# Patient Record
Sex: FEMALE | Race: AMERICAN INDIAN OR ALASKA NATIVE
[De-identification: names, ages, dates, MRNs, and addresses within clinical notes are randomized per-mention and may not be internally consistent; named-entity substitution may affect disease eponyms.]

---

## 2020-11-19 ENCOUNTER — HOSPITAL ENCOUNTER (EMERGENCY)
Dept: HOSPITAL 5 - ED | Age: 55
Discharge: OTHER INSTITUTION | End: 2020-11-19
Payer: SELF-PAY

## 2020-11-19 VITALS — DIASTOLIC BLOOD PRESSURE: 57 MMHG | SYSTOLIC BLOOD PRESSURE: 101 MMHG

## 2020-11-19 DIAGNOSIS — E87.2: ICD-10-CM

## 2020-11-19 DIAGNOSIS — R41.82: ICD-10-CM

## 2020-11-19 DIAGNOSIS — Z79.899: ICD-10-CM

## 2020-11-19 DIAGNOSIS — Z79.82: ICD-10-CM

## 2020-11-19 DIAGNOSIS — I61.9: ICD-10-CM

## 2020-11-19 DIAGNOSIS — I10: Primary | ICD-10-CM

## 2020-11-19 LAB
ALBUMIN SERPL-MCNC: 4.4 G/DL (ref 3.9–5)
ALT SERPL-CCNC: 12 UNITS/L (ref 7–56)
APTT BLD: 27.7 SEC. (ref 24.2–36.6)
BASOPHILS # (AUTO): 0.1 K/MM3 (ref 0–0.1)
BASOPHILS NFR BLD AUTO: 0.4 % (ref 0–1.8)
BUN SERPL-MCNC: 12 MG/DL (ref 7–17)
BUN/CREAT SERPL: 24 %
CALCIUM SERPL-MCNC: 9.8 MG/DL (ref 8.4–10.2)
EOSINOPHIL # BLD AUTO: 0.1 K/MM3 (ref 0–0.4)
EOSINOPHIL NFR BLD AUTO: 0.3 % (ref 0–4.3)
HCO3 BLDA-SCNC: 21 MMOL/L (ref 20–26)
HCT VFR BLD CALC: 34 % (ref 30.3–42.9)
HEMOLYSIS INDEX: 62
HGB BLD-MCNC: 11.6 GM/DL (ref 10.1–14.3)
INR PPP: 1.04 (ref 0.87–1.13)
LYMPHOCYTES # BLD AUTO: 4.2 K/MM3 (ref 1.2–5.4)
LYMPHOCYTES NFR BLD AUTO: 14.1 % (ref 13.4–35)
MCHC RBC AUTO-ENTMCNC: 34 % (ref 30–34)
MCV RBC AUTO: 91 FL (ref 79–97)
MONOCYTES # (AUTO): 1.3 K/MM3 (ref 0–0.8)
MONOCYTES % (AUTO): 4.3 % (ref 0–7.3)
PCO2 BLDA: 36.7 MM HG
PH BLDA: 7.38 PH UNITS (ref 7.35–7.45)
PLATELET # BLD: 526 K/MM3 (ref 140–440)
PO2 BLDA: 172.6 MM HG (ref 80–90)
RBC # BLD AUTO: 3.74 M/MM3 (ref 3.65–5.03)

## 2020-11-19 PROCEDURE — 80320 DRUG SCREEN QUANTALCOHOLS: CPT

## 2020-11-19 PROCEDURE — 82553 CREATINE MB FRACTION: CPT

## 2020-11-19 PROCEDURE — 96368 THER/DIAG CONCURRENT INF: CPT

## 2020-11-19 PROCEDURE — 85670 THROMBIN TIME PLASMA: CPT

## 2020-11-19 PROCEDURE — 36415 COLL VENOUS BLD VENIPUNCTURE: CPT

## 2020-11-19 PROCEDURE — 93005 ELECTROCARDIOGRAM TRACING: CPT

## 2020-11-19 PROCEDURE — 85025 COMPLETE CBC W/AUTO DIFF WBC: CPT

## 2020-11-19 PROCEDURE — 82803 BLOOD GASES ANY COMBINATION: CPT

## 2020-11-19 PROCEDURE — 84484 ASSAY OF TROPONIN QUANT: CPT

## 2020-11-19 PROCEDURE — 99292 CRITICAL CARE ADDL 30 MIN: CPT

## 2020-11-19 PROCEDURE — 82140 ASSAY OF AMMONIA: CPT

## 2020-11-19 PROCEDURE — 82550 ASSAY OF CK (CPK): CPT

## 2020-11-19 PROCEDURE — 96366 THER/PROPH/DIAG IV INF ADDON: CPT

## 2020-11-19 PROCEDURE — 96375 TX/PRO/DX INJ NEW DRUG ADDON: CPT

## 2020-11-19 PROCEDURE — 70450 CT HEAD/BRAIN W/O DYE: CPT

## 2020-11-19 PROCEDURE — 85610 PROTHROMBIN TIME: CPT

## 2020-11-19 PROCEDURE — G0480 DRUG TEST DEF 1-7 CLASSES: HCPCS

## 2020-11-19 PROCEDURE — 94002 VENT MGMT INPAT INIT DAY: CPT

## 2020-11-19 PROCEDURE — 96365 THER/PROPH/DIAG IV INF INIT: CPT

## 2020-11-19 PROCEDURE — 31500 INSERT EMERGENCY AIRWAY: CPT

## 2020-11-19 PROCEDURE — 99291 CRITICAL CARE FIRST HOUR: CPT

## 2020-11-19 PROCEDURE — 80053 COMPREHEN METABOLIC PANEL: CPT

## 2020-11-19 PROCEDURE — 85730 THROMBOPLASTIN TIME PARTIAL: CPT

## 2020-11-19 PROCEDURE — 71045 X-RAY EXAM CHEST 1 VIEW: CPT

## 2020-11-19 NOTE — CONSULTATION
History of Present Illness


History of present illness: 





[facility]


[facility]








Date of Service:   11/19/2020 01:47:14





Impression:


      SAH, SDH, ICH





Comments/Sign-Out:


Patient is a 62 year old woman who presented with EMS as she was found 

unresponsive on the ground by her family at home, last time they saw her walking

and talking was last night at 930 pm, she is now not responding. Per report she 

had a stroke 2 weeks ago but dont know deficit from that - Cardene gtt and BP to

be < 140/80 - STAT NSG consult - No AP or AC





Metrics:


Last Known Well: 11/18/2020 21:30:00


[facility] Notification Time: 11/19/2020 01:47:14


Arrival Time: 11/19/2020 01:45:00


Stamp Time: 11/19/2020 01:47:14


Time First Login Attempt: 11/19/2020 01:52:00


Video Start Time: 11/19/2020 01:52:00





Symptoms: AMS


NIHSS Start Assessment Time: 11/19/2020 02:00:00


Patient is not a candidate for Alteplase/Activase.


Patient was not deemed candidate for Alteplase/Activase thrombolytics because of

Current or Previous ICH.


Video End Time: 11/19/2020 02:06:28





CT head was reviewed and results were: SDH, HECTOR and SAH





Clinical Presentation is not Suggestive of Large Vessel Occlusive Disease





ED Physician notified of diagnostic impression and management plan on 11/19/2020

02:06:30





Our recommendations are outlined below.





Recommendations:


      Activate Stroke Protocol Admission/Order Set


      Stroke/Telemetry Floor


      Neuro Checks


      Bedside Swallow Eval


      DVT Prophylaxis


      IV Fluids, Normal Saline


      Head of Bed 30 Degrees


      Euglycemia and Avoid Hyperthermia (PRN Acetaminophen)





Routine Consultation with Inhouse Neurology for Follow up Care





Sign Out:


      Discussed with Emergency Department Provider











------------------------------------------------------------------------------





History of Present Illness:


Patient is a 55 year old Female.





Patient was brought by EMS for symptoms of AMS





Patient is a 55 year old woman who presented with EMS as she was found 

unresponsive on the ground by her family at home, last time they saw her walking

and talking was last night at 930 pm, she is now not responding. Per report she 

had a stroke 2 weeks ago but dont know deficit from that


 


 


 





Examination:


BP(230/110), Pulse(80s), Blood Glucose(na)


NIHSS cannot be completed due to patient status.





Patient is not responding to painful or verbal stimuli and not awake





Pre-Morbid Modified Ranking Scale:


5 Points = Severe disability; bedridden, incontinent and requiring constant nurs

ing care and attention





Patient/Family was informed the Neurology Consult would happen via TeleHealth 

consult by way of interactive audio and video telecommunications and consented 

to receiving care in this manner.








Due to the immediate potential for life-threatening deterioration due to 

underlying acute neurologic illness, I spent 30 minutes providing critical care.

This time includes time for face to face visit via telemedicine, review of 

medical records, imaging studies and discussion of findings with providers, the 

patient and/or family.








Dr Aram Contreras








[facility]


[phone]





Case [id number]


 





Medications and Allergies


Active Meds: 


Active Medications





Nicardipine HCl 50 mg/ Sodium (Chloride)  250 mls @ 25 mls/hr IV TITR PALMOO; 

Protocol

## 2020-11-19 NOTE — XRAY REPORT
CHEST 1 VIEW 11/19/2020 1:52 AM



INDICATION / CLINICAL INFORMATION: ams.



COMPARISON: None available.



FINDINGS:



SUPPORT DEVICES: ET tube tip is about 3 cm above the yrn. NG tube is seen extending below the diap
hragm.



HEART / MEDIASTINUM: No significant abnormality. 



LUNGS / PLEURA: There are mild bibasilar opacities. No pneumothorax. 



ADDITIONAL FINDINGS: No significant additional findings.



IMPRESSION:

1. Mild bibasilar pulmonary opacities that could reflect atelectasis, aspiration, or developing infec
tious process.



Signer Name: Brian Zuluaga MD 

Signed: 11/19/2020 2:57 AM

Workstation Name: [id number]

## 2020-11-19 NOTE — CAT SCAN REPORT
CT head/brain wo con



INDICATION:

Stroke-Like symptoms.



TECHNIQUE:

Head CT without contrast. All CT scans at this location are performed using CT dose reduction for ALA
RA by means of automated exposure control. 



COMPARISON:

None available.



FINDINGS:

There is a large parenchymal hemorrhage in the right frontal lobe measuring 3.1 cm with adjacent vaso
genic edema. There is also a right cerebral convexity subdural hematoma measuring about 1.2 cm in luz
e to side dimension. There is a subacute appearing right MCA territory infarct. There is significant 
leftward midline shift about 1.1 cm at the level of the septum pellucidum. There is also early right-
sided downward transtentorial herniation. There is entrapment of the left lateral ventricle.



IMPRESSION:

1. Multifocal intracranial hemorrhage including a right frontal parenchymal hemorrhage and large righ
t cerebral convexity subdural hematoma.

2. Subacute appearing right MCA territory infarct.

3. Significant adverse mass effect with leftward midline shift, entrapment of the left lateral ventri
cecilia, right sided downward transtentorial herniation.



Findings called to Dr. Santana at 1:15 AM on 11/19/2020.



Signer Name: Brian Zuluaga MD 

Signed: 11/19/2020 2:19 AM

Workstation Name: [id number]

## 2020-11-19 NOTE — EMERGENCY DEPARTMENT REPORT
ED Altered Mental Status HPI





- General


Chief Complaint: Altered Mental Status


Stated Complaint: UNRESPONSIVE


PUI?: No


Time Seen by Provider: 11/19/20 01:46


Source: EMS


Mode of arrival: Stretcher


Limitations: Altered Mental Status, Physical Limitation





- History of Present Illness


Initial Comments: 





Patient is a 55-year-old female that presents emergency room with altered mental

status and decreased responsiveness.  Report received from EMS.  EMS states the 

patient's initial blood pressure is 238/120.  Patient was found unresponsive.  

Patient is responsive only to painful stimuli.  Last known well time 9:30 PM 

11/18/2020.  Patient had a stroke 2 weeks ago and was seen at [facility].








MD Complaint: altered mental status, decreased responsiveness


-: Sudden





- Related Data


                                Home Medications











 Medication  Instructions  Recorded  Confirmed  Last Taken


 


Aspirin 81 mg PO DAILY 11/19/20 11/19/20 Unknown


 


AtorvaSTATin 80 mg PO DAILY 11/19/20 11/19/20 Unknown


 


Lisinopril 10 mg PO DAILY 11/19/20 11/19/20 Unknown


 


Ticagrelor 60 mg PO BID 11/19/20 11/19/20 Unknown


 


glipiZIDE 5 mg PO BID 11/19/20 11/19/20 Unknown


 


metFORMIN 1,000 mg PO BID 11/19/20 11/19/20 Unknown











                                    Allergies











Allergy/AdvReac Type Severity Reaction Status Date / Time


 


No Known Allergies Allergy   Verified 11/19/20 02:38














ED Review of Systems


ROS: 


Stated complaint: UNRESPONSIVE


Other details as noted in HPI





Comment: Unobtainable due to pts medical conditions





ED Past Medical Hx





- Past Medical History


Previous Medical History?: Yes


Hx Hypertension: Yes


Hx CVA: Yes





- Surgical History


Past Surgical History?: No





- Family History


Family history: no significant





- Social History


Smoking Status: Unknown if ever smoked


Substance Use Type: None





- Medications


Home Medications: 


                                Home Medications











 Medication  Instructions  Recorded  Confirmed  Last Taken  Type


 


Aspirin 81 mg PO DAILY 11/19/20 11/19/20 Unknown History


 


AtorvaSTATin 80 mg PO DAILY 11/19/20 11/19/20 Unknown History


 


Lisinopril 10 mg PO DAILY 11/19/20 11/19/20 Unknown History


 


Ticagrelor 60 mg PO BID 11/19/20 11/19/20 Unknown History


 


glipiZIDE 5 mg PO BID 11/19/20 11/19/20 Unknown History


 


metFORMIN 1,000 mg PO BID 11/19/20 11/19/20 Unknown History














ED Physical Exam





- General


Limitations: Altered Mental Status, Physical Limitation


General appearance: obtunded





- Head


Head exam: Present: atraumatic, normocephalic





- Eye


Eye exam: Present: normal appearance, other (Pupils are unequal)





- ENT


ENT exam: Present: mucous membranes dry





- Neck


Neck exam: Present: normal inspection





- Respiratory


Respiratory exam: Present: normal lung sounds bilaterally.  Absent: respiratory 

distress





- Cardiovascular


Cardiovascular Exam: Present: regular rate, normal rhythm.  Absent: systolic 

murmur, diastolic murmur, rubs, gallop





- GI/Abdominal


GI/Abdominal exam: Present: soft, normal bowel sounds.  Absent: distended, 

tenderness, guarding





- Extremities Exam


Extremities exam: Present: normal inspection





- Back Exam


Back exam: Present: normal inspection





- Neurological Exam


Neurological exam: Present: altered





- Expanded Neurological Exam


  ** Expanded


Best Eye Response (Dot): (1) no response


Best Motor Response (Dot): (4) withdraws to pain


Best Verbal Response (Simon): (1) no verbal response


Dot Total: 6





- Psychiatric


Psychiatric exam: Present: normal affect, normal mood





- Skin


Skin exam: Present: warm, dry, intact, normal color.  Absent: rash





- Assessment


Assessment Interval: Baseline





- Level of Consciousness


1a. Level of Consciousness: coma/unresponsive





- LOC Questions


1b. LOC Questions: aphasic





- LOC Command


1c. LOC Commands: performs no tasks correctly





- Best Gaze


2. Best Gaze: normal





- Visual


3. Visual: no visual loss





- Facial Palsy


4. Facial Palsy: normal symmetrical movement





- Motor Arm


5a. Motor Arm Left: no movement


5b. Motor Arm Right: no movement





- Motor Leg


6a. Motor Leg Left: no movement


6b. Motor Leg Right: no movement





- Limb Ataxia


7. Limb Ataxia: absent





- Sensory


8. Sensory: normal





- Best Language


9. Best Language: coma/unresponsive





- Dysarthria


10. Dysarthria: mute/anarrthric





- Extinction and Inattention


11. Extinction/Inattention: no abnormality





- Scoring


Total Score: 28


Stroke Severity: Severe Stroke





ED Course


                                   Vital Signs











  11/19/20 11/19/20 11/19/20





  02:42 02:50 04:13


 


Temperature 93 F L  


 


Pulse Rate 103 H 106 H 111 H


 


Respiratory 16  16





Rate   


 


Blood Pressure 130/74 130/74 


 


Blood Pressure   101/57





[Left]   


 


O2 Sat by Pulse 99 100 100





Oximetry   














- Reevaluation(s)


Reevaluation #1: 


Initial evaluation done.  Code stroke initiated.  Nicardipine drip ordered.


11/19/20 01:46

















Reevaluation #2: 


Patient back from CT.  Patient will be intubated.


11/19/20 02:01








Patient is better without difficulty.  See procedure note.


11/19/20 02:20





Reevaluation #3: 


Patient's blood pressure is improving with nicardipine drip.


11/19/20 02:36





Reevaluation #4: 


Patient's blood pressure continues to improve.


11/19/20 02:47





Patient is on the vent.  Patient is on Ativan drip.  Patient has already 

received Keppra.  Patient is on Cardene drip and blood pressure is controlled.


11/19/20 03:25








- Consultations


Consultation #1: 


I discussed case with [facility] transfer [place] and [facility] is on diversion.


11/19/20 02:34








I [place] [facility] [place] [facility]  [facility] [place] [facility] [place] 

diversion.


11/19/20 03:50





Consultation #2: 


I discussed case with Dr. De Los Santos at [facility].  Dr. De Los Santos is for the patient to be 

transferred.


11/19/20 03:10








- Intubation


Time Out Performed: Yes


Sedative: Etomidate


Paralytic: Rocuronium


Laryngoscope: fiberoptic video scope


Size: 3


ET Tube Size: 7.5


Tube Secured Depth (cm): 22


Tube Secured Location: teeth


Tube Placement Confirmation: visualized tube passing t, equal breath sounds 

bilat, no breath sounds over epi, confirmation by capnometr


Patient Tolerated Procedure: well, no complications


Intubation Complications: none





- Lab Data


Result diagrams: 


                                 11/19/20 02:43





                                 11/19/20 02:43


                                   Lab Results











  11/19/20 11/19/20 11/19/20 Range/Units





  02:43 02:43 02:43 


 


WBC  30.0 H    (4.5-11.0)  K/mm3


 


RBC  3.74    (3.65-5.03)  M/mm3


 


Hgb  11.6    (10.1-14.3)  gm/dl


 


Hct  34.0    (30.3-42.9)  %


 


MCV  91    (79-97)  fl


 


MCH  31    (28-32)  pg


 


MCHC  34    (30-34)  %


 


RDW  14.4    (13.2-15.2)  %


 


Plt Count  526 H    (140-440)  K/mm3


 


Lymph % (Auto)  14.1    (13.4-35.0)  %


 


Mono % (Auto)  4.3    (0.0-7.3)  %


 


Eos % (Auto)  0.3    (0.0-4.3)  %


 


Baso % (Auto)  0.4    (0.0-1.8)  %


 


Lymph # (Auto)  4.2    (1.2-5.4)  K/mm3


 


Mono # (Auto)  1.3 H    (0.0-0.8)  K/mm3


 


Eos # (Auto)  0.1    (0.0-0.4)  K/mm3


 


Baso # (Auto)  0.1    (0.0-0.1)  K/mm3


 


Seg Neutrophils %  80.9 H    (40.0-70.0)  %


 


Seg Neutrophils #  24.2 H    (1.8-7.7)  K/mm3


 


PT   13.7   (12.2-14.9)  Sec.


 


INR   1.04   (0.87-1.13)  


 


APTT   27.7   (24.2-36.6)  Sec.


 


Thrombin Time   14.3 L   (15.1-19.6)  Sec.


 


ABG pH     (7.350-7.450)  pH Units


 


ABG pCO2     mm Hg


 


ABG pO2     (80.0-90.0)  mm Hg


 


ABG HCO3     (20.0-26.0)  mmol/L


 


ABG O2 Saturation     (95.0-99.0)  %


 


ABG O2 Content     (0.0-44)  


 


ABG Base Excess     (-2.0-3.0)  mmol/L


 


ABG Hemoglobin     (12.0-16.0)  gm/dl


 


ABG Carboxyhemoglobin     (0.0-5.0)  %


 


ABG Methemoglobin     (0.0-1.5)  %


 


Oxyhemoglobin     (95.0-99.0)  %


 


FiO2     %


 


Sodium    137  (137-145)  mmol/L


 


Potassium    3.9  (3.6-5.0)  mmol/L


 


Chloride    98.8  ()  mmol/L


 


Carbon Dioxide    21 L  (22-30)  mmol/L


 


Anion Gap    21  mmol/L


 


BUN    12  (7-17)  mg/dL


 


Creatinine    0.5 L  (0.6-1.2)  mg/dL


 


Estimated GFR    > 60  ml/min


 


BUN/Creatinine Ratio    24  %


 


Glucose    268 H  ()  mg/dL


 


Lactic Acid     (0.7-2.0)  mmol/L


 


Calcium    9.8  (8.4-10.2)  mg/dL


 


Total Bilirubin    0.30  (0.1-1.2)  mg/dL


 


AST    23  (5-40)  units/L


 


ALT    12  (7-56)  units/L


 


Alkaline Phosphatase    119  ()  units/L


 


Ammonia     (25-60)  umol/L


 


Total Creatine Kinase    100  ()  units/L


 


CK-MB (CK-2)    1.6  (0.0-4.0)  ng/mL


 


CK-MB (CK-2) Rel Index    1.6  (0-4)  


 


Troponin T    < 0.010  (0.00-0.029)  ng/mL


 


Total Protein    7.4  (6.3-8.2)  g/dL


 


Albumin    4.4  (3.9-5)  g/dL


 


Albumin/Globulin Ratio    1.5  %


 


Salicylates     (2.8-20.0)  mg/dL


 


Acetaminophen     (10.0-30.0)  ug/mL














  11/19/20 11/19/20 11/19/20 Range/Units





  02:43 02:43 02:43 


 


WBC     (4.5-11.0)  K/mm3


 


RBC     (3.65-5.03)  M/mm3


 


Hgb     (10.1-14.3)  gm/dl


 


Hct     (30.3-42.9)  %


 


MCV     (79-97)  fl


 


MCH     (28-32)  pg


 


MCHC     (30-34)  %


 


RDW     (13.2-15.2)  %


 


Plt Count     (140-440)  K/mm3


 


Lymph % (Auto)     (13.4-35.0)  %


 


Mono % (Auto)     (0.0-7.3)  %


 


Eos % (Auto)     (0.0-4.3)  %


 


Baso % (Auto)     (0.0-1.8)  %


 


Lymph # (Auto)     (1.2-5.4)  K/mm3


 


Mono # (Auto)     (0.0-0.8)  K/mm3


 


Eos # (Auto)     (0.0-0.4)  K/mm3


 


Baso # (Auto)     (0.0-0.1)  K/mm3


 


Seg Neutrophils %     (40.0-70.0)  %


 


Seg Neutrophils #     (1.8-7.7)  K/mm3


 


PT     (12.2-14.9)  Sec.


 


INR     (0.87-1.13)  


 


APTT     (24.2-36.6)  Sec.


 


Thrombin Time     (15.1-19.6)  Sec.


 


ABG pH     (7.350-7.450)  pH Units


 


ABG pCO2     mm Hg


 


ABG pO2     (80.0-90.0)  mm Hg


 


ABG HCO3     (20.0-26.0)  mmol/L


 


ABG O2 Saturation     (95.0-99.0)  %


 


ABG O2 Content     (0.0-44)  


 


ABG Base Excess     (-2.0-3.0)  mmol/L


 


ABG Hemoglobin     (12.0-16.0)  gm/dl


 


ABG Carboxyhemoglobin     (0.0-5.0)  %


 


ABG Methemoglobin     (0.0-1.5)  %


 


Oxyhemoglobin     (95.0-99.0)  %


 


FiO2     %


 


Sodium     (137-145)  mmol/L


 


Potassium     (3.6-5.0)  mmol/L


 


Chloride     ()  mmol/L


 


Carbon Dioxide     (22-30)  mmol/L


 


Anion Gap     mmol/L


 


BUN     (7-17)  mg/dL


 


Creatinine     (0.6-1.2)  mg/dL


 


Estimated GFR     ml/min


 


BUN/Creatinine Ratio     %


 


Glucose     ()  mg/dL


 


Lactic Acid  3.10 H*    (0.7-2.0)  mmol/L


 


Calcium     (8.4-10.2)  mg/dL


 


Total Bilirubin     (0.1-1.2)  mg/dL


 


AST     (5-40)  units/L


 


ALT     (7-56)  units/L


 


Alkaline Phosphatase     ()  units/L


 


Ammonia   26.0   (25-60)  umol/L


 


Total Creatine Kinase    87  ()  units/L


 


CK-MB (CK-2)     (0.0-4.0)  ng/mL


 


CK-MB (CK-2) Rel Index     (0-4)  


 


Troponin T     (0.00-0.029)  ng/mL


 


Total Protein     (6.3-8.2)  g/dL


 


Albumin     (3.9-5)  g/dL


 


Albumin/Globulin Ratio     %


 


Salicylates     (2.8-20.0)  mg/dL


 


Acetaminophen     (10.0-30.0)  ug/mL














  11/19/20 11/19/20 11/19/20 Range/Units





  02:43 02:43 03:35 


 


WBC     (4.5-11.0)  K/mm3


 


RBC     (3.65-5.03)  M/mm3


 


Hgb     (10.1-14.3)  gm/dl


 


Hct     (30.3-42.9)  %


 


MCV     (79-97)  fl


 


MCH     (28-32)  pg


 


MCHC     (30-34)  %


 


RDW     (13.2-15.2)  %


 


Plt Count     (140-440)  K/mm3


 


Lymph % (Auto)     (13.4-35.0)  %


 


Mono % (Auto)     (0.0-7.3)  %


 


Eos % (Auto)     (0.0-4.3)  %


 


Baso % (Auto)     (0.0-1.8)  %


 


Lymph # (Auto)     (1.2-5.4)  K/mm3


 


Mono # (Auto)     (0.0-0.8)  K/mm3


 


Eos # (Auto)     (0.0-0.4)  K/mm3


 


Baso # (Auto)     (0.0-0.1)  K/mm3


 


Seg Neutrophils %     (40.0-70.0)  %


 


Seg Neutrophils #     (1.8-7.7)  K/mm3


 


PT     (12.2-14.9)  Sec.


 


INR     (0.87-1.13)  


 


APTT     (24.2-36.6)  Sec.


 


Thrombin Time     (15.1-19.6)  Sec.


 


ABG pH    7.375  (7.350-7.450)  pH Units


 


ABG pCO2    36.7  mm Hg


 


ABG pO2    172.6 H  (80.0-90.0)  mm Hg


 


ABG HCO3    21.0  (20.0-26.0)  mmol/L


 


ABG O2 Saturation    99.1 H  (95.0-99.0)  %


 


ABG O2 Content    16.9  (0.0-44)  


 


ABG Base Excess    -3.7 L  (-2.0-3.0)  mmol/L


 


ABG Hemoglobin    12.3  (12.0-16.0)  gm/dl


 


ABG Carboxyhemoglobin    3.1  (0.0-5.0)  %


 


ABG Methemoglobin    0.5  (0.0-1.5)  %


 


Oxyhemoglobin    95.5  (95.0-99.0)  %


 


FiO2    30  %


 


Sodium     (137-145)  mmol/L


 


Potassium     (3.6-5.0)  mmol/L


 


Chloride     ()  mmol/L


 


Carbon Dioxide     (22-30)  mmol/L


 


Anion Gap     mmol/L


 


BUN     (7-17)  mg/dL


 


Creatinine     (0.6-1.2)  mg/dL


 


Estimated GFR     ml/min


 


BUN/Creatinine Ratio     %


 


Glucose     ()  mg/dL


 


Lactic Acid     (0.7-2.0)  mmol/L


 


Calcium     (8.4-10.2)  mg/dL


 


Total Bilirubin     (0.1-1.2)  mg/dL


 


AST     (5-40)  units/L


 


ALT     (7-56)  units/L


 


Alkaline Phosphatase     ()  units/L


 


Ammonia     (25-60)  umol/L


 


Total Creatine Kinase     ()  units/L


 


CK-MB (CK-2)     (0.0-4.0)  ng/mL


 


CK-MB (CK-2) Rel Index     (0-4)  


 


Troponin T     (0.00-0.029)  ng/mL


 


Total Protein     (6.3-8.2)  g/dL


 


Albumin     (3.9-5)  g/dL


 


Albumin/Globulin Ratio     %


 


Salicylates  2.9    (2.8-20.0)  mg/dL


 


Acetaminophen   5.0 L   (10.0-30.0)  ug/mL














- EKG Data


-: EKG Interpreted by Me


EKG shows normal: sinus rhythm, intervals, ST-T waves


Rate: tachycardia


Interpretation: other (Right bundle branch block, axis deviation.)





- Radiology Data


Radiology results: report reviewed, image reviewed





 CT head/brain wo con 





INDICATION: 


Stroke-Like symptoms. 





TECHNIQUE: 


Head CT without contrast. All CT scans at this location are performed using CT 

dose reduction for 


 ALARA by means of automated exposure control. 





COMPARISON: 


None available. 





FINDINGS: 


There is a large parenchymal hemorrhage in the right frontal lobe measuring 3.1 

cm with adjacent 


 vasogenic edema. There is also a right cerebral convexity subdural hematoma 

measuring about 1.2 cm 


 in side to side dimension. There is a subacute appearing right MCA territory 

infarct. There is 


 significant leftward midline shift about 1.1 cm at the level of the septum 

pellucidum. There is also


 early right-sided downward transtentorial herniation. There is entrapment of 

the left lateral 


 ventricle. 





IMPRESSION: 


1. Multifocal intracranial hemorrhage including a right frontal parenchymal 

hemorrhage and large 


 right cerebral convexity subdural hematoma. 


2. Subacute appearing right MCA territory infarct. 


3. Significant adverse mass effect with leftward midline shift, entrapment of th

e left lateral 


 ventricle, right sided downward transtentorial herniation. 





============================================ 








 CHEST 1 VIEW 11/19/2020 1:52 AM 





INDICATION / CLINICAL INFORMATION: ams. 





COMPARISON: None available. 





FINDINGS: 





SUPPORT DEVICES: ET tube tip is about 3 cm above the yrn. NG tube is seen 

extending below the 


 diaphragm. 





HEART / MEDIASTINUM: No significant abnormality. 





LUNGS / PLEURA: There are mild bibasilar opacities. No pneumothorax. 





ADDITIONAL FINDINGS: No significant additional findings. 





IMPRESSION: 


1. Mild bibasilar pulmonary opacities that could reflect atelectasis, 

aspiration, or developing 


 infectious process. 


============================================ 








- Medical Decision Making





Patient is a 55-year-old female that presents emergency room with altered mental

 status, unresponsive, elevated blood pressure.  Patient's last known well time 

9:30 PM yesterday.  Upon initial evaluation, the a code stroke was initiated.  

Patient's plain CT of the head was done.  Patient CT of the head shows 

intracranial hemorrhage and a subdural hematoma.  Midline shift also noted on 

the CT scan.  Patient mental status deteriorating and patient was intubated to 

protect the airway.  See procedure note.  Patient was then placed on Ativan drip

 for sedation.  Patient was then placed on a nicardipine drip for malignant 

hypertension.  Blood pressure improved on drip.  Patient also given mannitol and

 Keppra.  Patient had a chest x-ray after intubation.  Chest x-ray shows good 

placement of the ET tube and pneumonia.  Patient had labs done essentially 

unremarkable except for lactic acidosis.  I discussed this case with multiple 

transfer center and many hospitals are on diversion.  Finally I discussed the 

case with [facility].  [facility] has accepted the patient.  Patient will be transferred 

via ground EMS to [facility].











- Differential Diagnosis


Intracranial hemorrhage, stroke, altered mental status, respiratory failure





- Core Measures


AMI Core Measures Followed: Yes


Critical Care Time: Yes


Critical care time in (mins) excluding proc time.: 80


Critical care attestation.: 


If time is entered above; I have spent that time in minutes in the direct care 

of this critically ill patient, excluding procedure time.





Critical Care Time: 





80 minutes











ED Disposition


Clinical Impression: 


 Hypertensive emergency, Unresponsive, Lactic acidosis





ICH (intracerebral hemorrhage)


Qualifiers:


 Intracerebral hemorrhage etiology: nontraumatic Cerebral hemorrhage location: 

unspecified cerebral location Laterality: unspecified laterality Qualified 

Code(s): I61.9 - Nontraumatic intracerebral hemorrhage, unspecified





Altered mental state


Qualifiers:


 Altered mental status type: unspecified Qualified Code(s): R41.82 - Altered 

mental status, unspecified





Disposition: DC/TX-70 ANOTHER TYPE [id number]


Is pt being admited?: No


Does the pt Need Aspirin: No


Condition: Critical


Instructions:  Hypertension (ED)


Time of Disposition: 03:31